# Patient Record
Sex: FEMALE | Race: WHITE | Employment: OTHER | URBAN - METROPOLITAN AREA
[De-identification: names, ages, dates, MRNs, and addresses within clinical notes are randomized per-mention and may not be internally consistent; named-entity substitution may affect disease eponyms.]

---

## 2023-10-06 DIAGNOSIS — K31.9 GASTRIC LESION: Primary | ICD-10-CM

## 2023-10-20 RX ORDER — PROPRANOLOL HYDROCHLORIDE 40 MG/1
20 TABLET ORAL 2 TIMES DAILY
COMMUNITY
Start: 2023-09-11

## 2023-10-20 RX ORDER — LISINOPRIL 20 MG/1
20 TABLET ORAL DAILY
COMMUNITY
Start: 2023-08-18

## 2023-10-20 RX ORDER — FLUTICASONE PROPIONATE 50 MCG
1 SPRAY, SUSPENSION (ML) NASAL DAILY
COMMUNITY
Start: 2023-09-24

## 2023-10-20 RX ORDER — CLOBETASOL PROPIONATE 0.46 MG/ML
SOLUTION TOPICAL 2 TIMES DAILY
COMMUNITY
Start: 2023-05-09

## 2023-10-20 RX ORDER — MELOXICAM 15 MG/1
15 TABLET ORAL DAILY
COMMUNITY
Start: 2023-09-11

## 2023-10-20 RX ORDER — SIMVASTATIN 20 MG/1
20 TABLET, FILM COATED ORAL NIGHTLY
COMMUNITY
Start: 2023-09-20

## 2023-10-20 RX ORDER — GABAPENTIN 300 MG/1
300 CAPSULE ORAL 2 TIMES DAILY
COMMUNITY
Start: 2023-09-22

## 2023-10-20 RX ORDER — LEVOTHYROXINE SODIUM 88 UG/1
88 TABLET ORAL DAILY
COMMUNITY
Start: 2023-07-03 | End: 2023-10-26 | Stop reason: ALTCHOICE

## 2023-10-20 RX ORDER — AMITRIPTYLINE HYDROCHLORIDE 25 MG/1
25 TABLET, FILM COATED ORAL DAILY
COMMUNITY
Start: 2023-09-20

## 2023-10-20 RX ORDER — CHLORHEXIDINE GLUCONATE ORAL RINSE 1.2 MG/ML
15 SOLUTION DENTAL AS NEEDED
COMMUNITY
Start: 2023-09-13

## 2023-10-20 RX ORDER — OMEPRAZOLE 20 MG/1
20 CAPSULE, DELAYED RELEASE ORAL
COMMUNITY
Start: 2023-09-20

## 2023-10-20 RX ORDER — AMLODIPINE BESYLATE 10 MG/1
10 TABLET ORAL DAILY
COMMUNITY
Start: 2022-10-29

## 2023-10-20 RX ORDER — KETOCONAZOLE 20 MG/ML
SHAMPOO, SUSPENSION TOPICAL
COMMUNITY
Start: 2023-05-15

## 2023-10-25 PROBLEM — E03.9 HYPOTHYROIDISM: Status: ACTIVE | Noted: 2023-10-25

## 2023-10-25 PROBLEM — A49.02 MRSA (METHICILLIN RESISTANT STAPHYLOCOCCUS AUREUS): Status: ACTIVE | Noted: 2023-10-25

## 2023-10-25 PROBLEM — N90.4 LICHEN SCLEROSUS OF FEMALE GENITALIA: Status: ACTIVE | Noted: 2023-10-25

## 2023-10-25 PROBLEM — T07.XXXA FRACTURES: Status: ACTIVE | Noted: 2023-10-25

## 2023-10-25 PROBLEM — Z87.19 H/O DIVERTICULITIS OF COLON: Status: ACTIVE | Noted: 2023-10-25

## 2023-10-25 PROBLEM — I10 HYPERTENSION: Status: ACTIVE | Noted: 2023-10-25

## 2023-10-25 PROBLEM — L66.9 SCARRING ALOPECIA: Status: ACTIVE | Noted: 2023-10-25

## 2023-10-25 PROBLEM — G43.909 MIGRAINES: Status: ACTIVE | Noted: 2023-10-25

## 2023-10-25 PROBLEM — K21.9 GERD (GASTROESOPHAGEAL REFLUX DISEASE): Status: ACTIVE | Noted: 2023-10-25

## 2023-10-25 PROBLEM — M81.0 OSTEOPOROSIS: Status: ACTIVE | Noted: 2023-10-25

## 2023-10-25 PROBLEM — Z85.820 HISTORY OF MELANOMA: Status: ACTIVE | Noted: 2023-10-25

## 2023-10-25 PROBLEM — M19.90 ARTHRITIS: Status: ACTIVE | Noted: 2023-10-25

## 2023-10-25 RX ORDER — ONDANSETRON HYDROCHLORIDE 2 MG/ML
4 INJECTION, SOLUTION INTRAVENOUS ONCE AS NEEDED
Status: CANCELLED | OUTPATIENT
Start: 2023-10-25

## 2023-10-25 RX ORDER — DESONIDE 0.5 MG/G
1 CREAM TOPICAL 2 TIMES DAILY
COMMUNITY
Start: 2019-06-15

## 2023-10-25 RX ORDER — IBUPROFEN 800 MG/1
800 TABLET ORAL EVERY 6 HOURS PRN
COMMUNITY
Start: 2023-03-22

## 2023-10-25 RX ORDER — SPIRONOLACTONE 25 MG
20 TABLET ORAL
COMMUNITY

## 2023-10-25 RX ORDER — LOSARTAN POTASSIUM 50 MG/1
50 TABLET ORAL
COMMUNITY
Start: 2016-04-15 | End: 2023-10-26 | Stop reason: ALTCHOICE

## 2023-10-25 RX ORDER — NALOXONE HYDROCHLORIDE 1 MG/ML
0.2 INJECTION INTRAMUSCULAR; INTRAVENOUS; SUBCUTANEOUS EVERY 5 MIN PRN
Status: CANCELLED | OUTPATIENT
Start: 2023-10-25

## 2023-10-25 RX ORDER — HYDROCHLOROTHIAZIDE 25 MG/1
TABLET ORAL
COMMUNITY
Start: 2019-02-12 | End: 2023-10-26 | Stop reason: ALTCHOICE

## 2023-10-25 RX ORDER — MICONAZOLE NITRATE 2 %
1 POWDER (GRAM) TOPICAL AS NEEDED
COMMUNITY
Start: 2018-02-14

## 2023-10-25 RX ORDER — ECONAZOLE NITRATE 10 MG/G
1 CREAM TOPICAL
COMMUNITY
Start: 2018-02-14 | End: 2023-10-26 | Stop reason: ALTCHOICE

## 2023-10-25 RX ORDER — FLUMAZENIL 0.1 MG/ML
0.2 INJECTION INTRAVENOUS ONCE AS NEEDED
Status: CANCELLED | OUTPATIENT
Start: 2023-10-25

## 2023-10-25 RX ORDER — SODIUM CHLORIDE, SODIUM LACTATE, POTASSIUM CHLORIDE, CALCIUM CHLORIDE 600; 310; 30; 20 MG/100ML; MG/100ML; MG/100ML; MG/100ML
20 INJECTION, SOLUTION INTRAVENOUS CONTINUOUS
Status: CANCELLED | OUTPATIENT
Start: 2023-10-25

## 2023-10-25 RX ORDER — LEVOTHYROXINE SODIUM 100 UG/1
TABLET ORAL
COMMUNITY
Start: 2023-01-18

## 2023-10-25 RX ORDER — MUPIROCIN 20 MG/G
OINTMENT TOPICAL
COMMUNITY
Start: 2017-04-21 | End: 2023-10-26 | Stop reason: ALTCHOICE

## 2023-10-25 RX ORDER — ZOLMITRIPTAN 2.5 MG/1
2.5 TABLET, ORALLY DISINTEGRATING ORAL AS NEEDED
COMMUNITY

## 2023-10-25 RX ORDER — POTASSIUM CHLORIDE 1500 MG/1
TABLET, EXTENDED RELEASE ORAL
COMMUNITY
Start: 2022-10-29 | End: 2023-10-26 | Stop reason: ALTCHOICE

## 2023-10-25 RX ORDER — GABAPENTIN 100 MG/1
100 CAPSULE ORAL 2 TIMES DAILY
COMMUNITY
Start: 2023-09-07 | End: 2023-10-26 | Stop reason: ALTCHOICE

## 2023-10-25 RX ORDER — MULTIVITAMIN
1 TABLET ORAL DAILY
COMMUNITY
Start: 2008-03-31

## 2023-10-25 NOTE — H&P
History Of Present Illness  Tigist Atkins is a 78 y.o. female who is referred for EUS for further evaluation of an incidental submucosal nodule measuring about 50 mm in the gastric body on EGD performed 8/23 by Dr. Condon.        Past Medical History  Past Medical History:   Diagnosis Date    DM (diabetes mellitus), type 2 (CMS/HCC)     GERD (gastroesophageal reflux disease)     Hiatal hernia     Hypertension     Hypothyroidism     Migraine     Obesity        Surgical History  Past Surgical History:   Procedure Laterality Date    ADENOIDECTOMY      BACK SURGERY      BLADDER SUSPENSION      CATARACT EXTRACTION      DILATION AND CURETTAGE OF UTERUS      SINUS SURGERY      TONSILLECTOMY          Social History  She reports that she has never smoked. She has never used smokeless tobacco. She reports that she does not drink alcohol and does not use drugs.    Family History  No family history on file.     Allergies  Clindamycin, Bactrim [sulfamethoxazole-trimethoprim], Bactroban [mupirocin], Lipitor [atorvastatin], Penicillins, and Levofloxacin    Review of Systems   All other systems reviewed and are negative.         Physical Exam  Vitals reviewed.   Constitutional:       General: She is awake.   Pulmonary:      Effort: Pulmonary effort is normal.      Breath sounds: Normal breath sounds.   Abdominal:      General: Bowel sounds are normal.      Palpations: Abdomen is soft.      Tenderness: There is no abdominal tenderness.   Neurological:      Mental Status: She is alert and oriented to person, place, and time.   Psychiatric:         Attention and Perception: Attention and perception normal.         Behavior: Behavior normal.            Last Recorded Vitals  There were no vitals taken for this visit.    Relevant Results  Reviewed chart       Assessment/Plan      Proceed with EUS      Ana Lopez MD

## 2023-10-26 ENCOUNTER — ANESTHESIA (OUTPATIENT)
Dept: GASTROENTEROLOGY | Facility: HOSPITAL | Age: 78
End: 2023-10-26
Payer: MEDICARE

## 2023-10-26 ENCOUNTER — HOSPITAL ENCOUNTER (OUTPATIENT)
Dept: GASTROENTEROLOGY | Facility: HOSPITAL | Age: 78
Discharge: HOME | End: 2023-10-26
Payer: MEDICARE

## 2023-10-26 ENCOUNTER — ANESTHESIA EVENT (OUTPATIENT)
Dept: GASTROENTEROLOGY | Facility: HOSPITAL | Age: 78
End: 2023-10-26
Payer: MEDICARE

## 2023-10-26 VITALS
WEIGHT: 230 LBS | HEIGHT: 69 IN | HEART RATE: 62 BPM | TEMPERATURE: 96.8 F | SYSTOLIC BLOOD PRESSURE: 122 MMHG | DIASTOLIC BLOOD PRESSURE: 78 MMHG | RESPIRATION RATE: 16 BRPM | BODY MASS INDEX: 34.07 KG/M2 | OXYGEN SATURATION: 100 %

## 2023-10-26 DIAGNOSIS — K31.9 GASTRIC LESION: ICD-10-CM

## 2023-10-26 PROCEDURE — 2500000005 HC RX 250 GENERAL PHARMACY W/O HCPCS: Performed by: NURSE ANESTHETIST, CERTIFIED REGISTERED

## 2023-10-26 PROCEDURE — 3700000002 HC GENERAL ANESTHESIA TIME - EACH INCREMENTAL 1 MINUTE

## 2023-10-26 PROCEDURE — 43237 ENDOSCOPIC US EXAM ESOPH: CPT | Performed by: INTERNAL MEDICINE

## 2023-10-26 PROCEDURE — A43232 PR ESOPHAGOSCOPY INTRA/TRANSMURAL NEEDLE ASPIRAT/BX: Performed by: ANESTHESIOLOGY

## 2023-10-26 PROCEDURE — 7100000009 HC PHASE TWO TIME - INITIAL BASE CHARGE

## 2023-10-26 PROCEDURE — 43259 EGD US EXAM DUODENUM/JEJUNUM: CPT | Performed by: INTERNAL MEDICINE

## 2023-10-26 PROCEDURE — 7100000010 HC PHASE TWO TIME - EACH INCREMENTAL 1 MINUTE

## 2023-10-26 PROCEDURE — A43232 PR ESOPHAGOSCOPY INTRA/TRANSMURAL NEEDLE ASPIRAT/BX: Performed by: NURSE ANESTHETIST, CERTIFIED REGISTERED

## 2023-10-26 PROCEDURE — 2500000004 HC RX 250 GENERAL PHARMACY W/ HCPCS (ALT 636 FOR OP/ED): Performed by: NURSE ANESTHETIST, CERTIFIED REGISTERED

## 2023-10-26 PROCEDURE — 99100 ANES PT EXTEME AGE<1 YR&>70: CPT | Performed by: ANESTHESIOLOGY

## 2023-10-26 PROCEDURE — 3700000001 HC GENERAL ANESTHESIA TIME - INITIAL BASE CHARGE

## 2023-10-26 RX ORDER — PROPOFOL 10 MG/ML
INJECTION, EMULSION INTRAVENOUS AS NEEDED
Status: DISCONTINUED | OUTPATIENT
Start: 2023-10-26 | End: 2023-10-26

## 2023-10-26 RX ORDER — LIDOCAINE HYDROCHLORIDE 20 MG/ML
INJECTION, SOLUTION INFILTRATION; PERINEURAL AS NEEDED
Status: DISCONTINUED | OUTPATIENT
Start: 2023-10-26 | End: 2023-10-26

## 2023-10-26 RX ADMIN — PROPOFOL 50 MG: 10 INJECTION, EMULSION INTRAVENOUS at 14:24

## 2023-10-26 RX ADMIN — PROPOFOL 50 MG: 10 INJECTION, EMULSION INTRAVENOUS at 14:42

## 2023-10-26 RX ADMIN — PROPOFOL 50 MG: 10 INJECTION, EMULSION INTRAVENOUS at 14:36

## 2023-10-26 RX ADMIN — PROPOFOL 50 MG: 10 INJECTION, EMULSION INTRAVENOUS at 14:39

## 2023-10-26 RX ADMIN — SODIUM CHLORIDE, SODIUM LACTATE, POTASSIUM CHLORIDE, AND CALCIUM CHLORIDE: 600; 310; 30; 20 INJECTION, SOLUTION INTRAVENOUS at 14:13

## 2023-10-26 RX ADMIN — PROPOFOL 50 MG: 10 INJECTION, EMULSION INTRAVENOUS at 14:21

## 2023-10-26 RX ADMIN — PROPOFOL 50 MG: 10 INJECTION, EMULSION INTRAVENOUS at 14:27

## 2023-10-26 RX ADMIN — PROPOFOL 50 MG: 10 INJECTION, EMULSION INTRAVENOUS at 14:50

## 2023-10-26 RX ADMIN — PROPOFOL 50 MG: 10 INJECTION, EMULSION INTRAVENOUS at 14:33

## 2023-10-26 RX ADMIN — LIDOCAINE HYDROCHLORIDE 100 MG: 20 INJECTION, SOLUTION INFILTRATION; PERINEURAL at 14:19

## 2023-10-26 ASSESSMENT — PAIN SCALES - GENERAL
PAINLEVEL_OUTOF10: 5 - MODERATE PAIN
PAIN_LEVEL: 0
PAINLEVEL_OUTOF10: 0 - NO PAIN
PAINLEVEL_OUTOF10: 5 - MODERATE PAIN
PAINLEVEL_OUTOF10: 0 - NO PAIN
PAINLEVEL_OUTOF10: 0 - NO PAIN

## 2023-10-26 ASSESSMENT — PAIN - FUNCTIONAL ASSESSMENT
PAIN_FUNCTIONAL_ASSESSMENT: 0-10

## 2023-10-26 ASSESSMENT — PAIN DESCRIPTION - DESCRIPTORS: DESCRIPTORS: ACHING;DISCOMFORT

## 2023-10-26 ASSESSMENT — COLUMBIA-SUICIDE SEVERITY RATING SCALE - C-SSRS
2. HAVE YOU ACTUALLY HAD ANY THOUGHTS OF KILLING YOURSELF?: NO
1. IN THE PAST MONTH, HAVE YOU WISHED YOU WERE DEAD OR WISHED YOU COULD GO TO SLEEP AND NOT WAKE UP?: NO
6. HAVE YOU EVER DONE ANYTHING, STARTED TO DO ANYTHING, OR PREPARED TO DO ANYTHING TO END YOUR LIFE?: NO

## 2023-10-26 NOTE — ANESTHESIA PREPROCEDURE EVALUATION
Patient: Tigist Atkins    Procedure Information       Date/Time: 10/26/23 1300    Scheduled providers: Ana Lopez MD; Babak Tidwell MD    Procedure: ENDOSCOPIC ULTRASOUND (UPPER)    Location: [facility]            Relevant Problems   Cardiovascular   (+) Hypertension      Endocrine   (+) Hypothyroidism      GI   (+) GERD (gastroesophageal reflux disease)      Infectious Disease   (+) MRSA (methicillin resistant Staphylococcus aureus)      Other   (+) Arthritis       Clinical information reviewed:    Allergies  Meds   Med Hx  Surg Hx  OB Status           NPO/Void Status  Carbonhydrate Drink Given Prior to Surgery? : N  Date of Last Liquid: 10/26/23  Time of Last Liquid: 0900  Date of Last Solid: 10/25/23  Time of Last Solid: 2300  Last Intake Type: Clear fluids  Time of Last Void: 1300           Past Medical History:   Diagnosis Date    DM (diabetes mellitus), type 2 (CMS/HCC)     GERD (gastroesophageal reflux disease)     Hiatal hernia     Hypertension     Hypothyroidism     Migraine     Obesity       Past Surgical History:   Procedure Laterality Date    ADENOIDECTOMY      BACK SURGERY      BLADDER SUSPENSION      CATARACT EXTRACTION      DILATION AND CURETTAGE OF UTERUS      SINUS SURGERY      TONSILLECTOMY       Social History     Tobacco Use    Smoking status: Never    Smokeless tobacco: Never   Substance Use Topics    Alcohol use: Never    Drug use: Never      Current Outpatient Medications   Medication Instructions    amitriptyline (ELAVIL) 25 mg, oral, Daily    amLODIPine (NORVASC) 10 mg, oral, Daily    biotin/folic ac/vit Bcomp,C/Zn (BIOTIN FORTE ORAL) oral, Daily RT    calcium carbonate/vitamin D3 (CALCIUM 500 + D ORAL) oral, Daily RT    chlorhexidine (Peridex) 0.12 % solution 15 mL, Mouth/Throat, As needed    clobetasol (Temovate) 0.05 % external solution Topical, 2 times daily    desonide (DesOwen) 0.05 % cream 1 Application, Topical, 2 times daily, 5 DAYS ON, 2 DAYS OFF as  "NEEDED<BR>    fluticasone (Flonase) 50 mcg/actuation nasal spray 1 spray, Each Nostril, Daily    gabapentin (NEURONTIN) 300 mg, oral, 2 times daily    ibuprofen 800 mg, oral, Every 6 hours PRN    ketoconazole (NIZOral) 2 % shampoo Topical, Weekly    levothyroxine (Synthroid, Levoxyl) 100 mcg tablet     lisinopril 20 mg, oral, Daily    lutein 20 mg, oral    meloxicam (MOBIC) 15 mg, oral, Daily    miconazole (Micotin) 2 % powder 1 Application, Topical, As needed    multivit-min/iron/folic acid/K (ADULTS MULTIVITAMIN ORAL)     multivitamin tablet 1 tablet, oral, Daily    omeprazole (PRILOSEC) 20 mg, oral, Daily before breakfast    propranolol (INDERAL) 20 mg, oral, 2 times daily    simvastatin (ZOCOR) 20 mg, oral, Nightly    ZOLMitriptan (ZOMIG-ZMT) 2.5 mg, oral, As needed      Allergies   Allergen Reactions    Clindamycin Anaphylaxis    Atorvastatin Calcium Other     achy joints    Bactrim [Sulfamethoxazole-Trimethoprim] Angioedema    Bactroban [Mupirocin] Hives    Lipitor [Atorvastatin] Swelling    Penicillins Other    Levofloxacin Rash        Chemistry    No results found for: \"NA\", \"K\", \"CL\", \"CO2\", \"BUN\", \"CREATININE\", \"GLU\" No results found for: \"CALCIUM\", \"ALKPHOS\", \"AST\", \"ALT\", \"BILITOT\"       No results found for: \"WBC\", \"HGB\", \"HCT\", \"PLT\"  No results found for: \"PROTIME\", \"PTT\", \"INR\"  No results found for this or any previous visit (from the past 4464 hour(s)).  No results found for this or any previous visit from the past 1095 days.       Visit Vitals  /79 (BP Location: Right arm, Patient Position: Sitting)   Pulse 55   Temp 36.4 °C (97.5 °F) (Temporal)   Resp 18   SpO2 98%   OB Status Postmenopausal   Smoking Status Never        Anesthesia Evaluation      No history of anesthetic complications   Airway   Mallampati: III  TM distance: >3 FB  Neck ROM: full  Dental - normal exam     Pulmonary     breath sounds clear to auscultation  Cardiovascular     Rhythm: regular  Rate: normal    Neuro/Psych      " GI/Hepatic/Renal      Endo/Other    Abdominal  - normal exam                      Physical Exam    Airway  Mallampati: III  TM distance: >3 FB  Neck ROM: full     Cardiovascular   Rhythm: regular  Rate: normal     Dental - normal exam     Pulmonary   Breath sounds clear to auscultation     Abdominal - normal exam              Anesthesia Plan    ASA 3     MAC     intravenous induction   Anesthetic plan and risks discussed with patient.

## 2023-10-26 NOTE — ANESTHESIA POSTPROCEDURE EVALUATION
Patient: Tigist Atkins    Procedure Summary       Date: 10/26/23 Room / Location: [facility]    Anesthesia Start: 1414 Anesthesia Stop: 1502    Procedure: ENDOSCOPIC ULTRASOUND (UPPER) Diagnosis: Gastric lesion    Scheduled Providers: Ana Lopez MD; Babak Tidwell MD Responsible Provider: AUTUMN Albert    Anesthesia Type: MAC ASA Status: 3            Anesthesia Type: MAC    Vitals Value Taken Time   /75 10/26/23 1515   Temp 36 °C (96.8 °F) 10/26/23 1500   Pulse 58 10/26/23 1515   Resp 14 10/26/23 1515   SpO2 100 % 10/26/23 1515       Anesthesia Post Evaluation    Patient location during evaluation: PACU  Patient participation: complete - patient participated  Level of consciousness: sleepy but conscious  Pain score: 0  Pain management: satisfactory to patient  Airway patency: patent  Two or more strategies used to mitigate risk of obstructive sleep apnea  Cardiovascular status: acceptable  Respiratory status: acceptable  Hydration status: acceptable        There were no known notable events for this encounter.

## 2023-10-26 NOTE — DISCHARGE INSTRUCTIONS
Patient Instructions after an EUS      The anesthetics, sedatives or narcotics which were given to you today will be acting in your body for the next 24 hours, so you might feel a little sleepy or groggy.  This feeling should slowly wear off. Carefully read and follow the instructions.     You received sedation today:  - Do not drive or operate any machinery or power tools of any kind.   - No alcoholic beverages today, not even beer or wine.  - Do not make any important decisions or sign any legal documents.  - No over the counter medications that contain alcohol or that may cause drowsiness.  - Do not make any important decisions or sign any legal documents.    While it is common to experience mild to moderate abdominal distention, gas, or belching after your procedure, if any of these symptoms occur following discharge from the GI Lab or within one week of having your procedure, call the [facility] to be advised whether a visit to your nearest Urgent Care or Emergency Department is indicated.  Take this paper with you if you go.     - If you develop an allergic reaction to the medications that were given during your procedure such as difficulty breathing, rash, hives, severe nausea, vomiting or lightheadedness.- If you experience chest pain, shortness of breath, severe abdominal pain, fevers and chills.    -If you develop signs and symptoms of bleeding such as blood in your spit, if your stools turn black, tarry, or bloody    - If you have not urinated within 8 hours following your procedure.- If your IV site becomes painful, red, inflamed, or looks infected.

## 2023-10-26 NOTE — POST-PROCEDURE NOTE
1530- Criteria met for patient to discharge from Phase II; awaiting Dr. Lopez    1552- IV removed with IV catheter tip intact upon removal     1631- Dr. Lopez at bedside speaking with patient.    1635- Discharge instructions went over with patient and patient's . Patient and patient's  verified understanding verbally    1642- Patient transported to [facility] at this time in stable condition and with all belongings via wheelchair

## 2024-09-29 LAB
ANION GAP SERPL CALCULATED.3IONS-SCNC: 5 MEQ/L (ref 4–14)
BUN BLDV-MCNC: 25 MG/DL (ref 7–25)
CALCIUM SERPL-MCNC: 7.4 MG/DL (ref 8.5–10.5)
CHLORIDE BLD-SCNC: 92 MEQ/L (ref 98–107)
CO2: 25 MEQ/L (ref 21–31)
CREAT SERPL-MCNC: 0.89 MG/DL (ref 0.6–1.2)
CREATININE + EGFR PANEL: 74 ML/MIN
GFR NON-AFRICAN AMERICAN: 61 ML/MIN
GLUCOSE BLD-MCNC: 105 MG/DL (ref 70–99)
POTASSIUM SERPL-SCNC: 4.6 MEQ/L (ref 3.6–5)
SODIUM BLD-SCNC: 122 MEQ/L (ref 135–145)

## 2024-09-30 LAB
SODIUM BLD-SCNC: 120 MEQ/L (ref 135–145)
SODIUM BLD-SCNC: 122 MEQ/L (ref 135–145)
SODIUM BLD-SCNC: 122 MEQ/L (ref 135–145)

## 2024-10-01 LAB — SODIUM BLD-SCNC: 122 MEQ/L (ref 135–145)
